# Patient Record
Sex: FEMALE | Race: WHITE | NOT HISPANIC OR LATINO | Employment: UNEMPLOYED | URBAN - METROPOLITAN AREA
[De-identification: names, ages, dates, MRNs, and addresses within clinical notes are randomized per-mention and may not be internally consistent; named-entity substitution may affect disease eponyms.]

---
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
## 2025-03-16 ENCOUNTER — HEALTH MAINTENANCE LETTER (OUTPATIENT)
Age: 20
End: 2025-03-16

## 2025-04-10 ENCOUNTER — ANCILLARY PROCEDURE (OUTPATIENT)
Dept: GENERAL RADIOLOGY | Facility: CLINIC | Age: 20
End: 2025-04-10
Attending: FAMILY MEDICINE
Payer: COMMERCIAL

## 2025-04-10 ENCOUNTER — OFFICE VISIT (OUTPATIENT)
Dept: ORTHOPEDICS | Facility: CLINIC | Age: 20
End: 2025-04-10
Payer: COMMERCIAL

## 2025-04-10 VITALS — HEIGHT: 63 IN | BODY MASS INDEX: 21.26 KG/M2 | WEIGHT: 120 LBS

## 2025-04-10 DIAGNOSIS — M25.552 PAIN OF LEFT HIP: ICD-10-CM

## 2025-04-10 DIAGNOSIS — M25.562 ACUTE PAIN OF LEFT KNEE: Primary | ICD-10-CM

## 2025-04-10 DIAGNOSIS — S76.012A STRAIN OF LEFT HIP, INITIAL ENCOUNTER: ICD-10-CM

## 2025-04-10 DIAGNOSIS — M22.2X2 PATELLOFEMORAL SYNDROME OF LEFT KNEE: ICD-10-CM

## 2025-04-10 DIAGNOSIS — M25.562 ACUTE PAIN OF LEFT KNEE: ICD-10-CM

## 2025-04-10 DIAGNOSIS — S76.012A MUSCLE STRAIN OF LEFT GLUTEAL REGION, INITIAL ENCOUNTER: ICD-10-CM

## 2025-04-10 NOTE — PATIENT INSTRUCTIONS
1. Acute pain of left knee    2. Pain of left hip    3. Patellofemoral syndrome of left knee    4. Muscle strain of left gluteal region, initial encounter    5. Strain of left hip, initial encounter      -Patient has acute left knee pain due to patellofemoral syndrome hip pain due to hip flexor and gluteal strain  -X-rays taken in office today of the left hip and knee no acute fracture dislocation or significant degenerative osseous abnormalities.  Patient has a small osteophyte lateral aspect of the patella  -Patient has had progressively worsening pain while running but has now progressed to walking  -Patient will start formal physical therapy and home exercise program to strengthen and stabilize the muscles of her leg.  -Patient will stop running.  She may crosstrain with biking and ellipitcal and swimming.  Patient will stop leg workouts as well.  She may continue upper body work outs as her pain allows  -Patient will stop running and leg training with [employer] and be excused from physical test until 5/8/25  -Concern for potential developing mild stress fracture in the hip.  If no improvement in weightbearing activity in 4 weeks, t/c MRI for further evaluation.  -Patient will be re-evaluated on 5/8/25  -Call direct clinic number [[phone]] at any time with questions or concerns.    Albert Yeo MD [facility]  [facility] [place]

## 2025-04-10 NOTE — LETTER
4/10/2025      Katie Walters  [address] [place] [address]      Dear Colleague,    Thank you for referring your patient, Katie Walters, to the [facility] [place]. Please see a copy of my visit note below.    ASSESSMENT & PLAN  Patient Instructions     1. Acute pain of left knee    2. Pain of left hip    3. Patellofemoral syndrome of left knee    4. Muscle strain of left gluteal region, initial encounter    5. Strain of left hip, initial encounter      -Patient has acute left knee pain due to patellofemoral syndrome hip pain due to hip flexor and gluteal strain  -X-rays taken in office today of the left hip and knee no acute fracture dislocation or significant degenerative osseous abnormalities.  Patient has a small osteophyte lateral aspect of the patella  -Patient has had progressively worsening pain while running but has now progressed to walking  -Patient will start formal physical therapy and home exercise program to strengthen and stabilize the muscles of her leg.  -Patient will stop running.  She may crosstrain with biking and ellipitcal and swimming.  Patient will stop leg workouts as well.  She may continue upper body work outs as her pain allows  -Patient will stop running and leg training with [employer] and be excused from physical test until 5/8/25  -Concern for potential developing mild stress fracture in the hip.  If no improvement in weightbearing activity in 4 weeks, t/c MRI for further evaluation.  -Patient will be re-evaluated on 5/8/25  -Call direct clinic number [[phone]] at any time with questions or concerns.    Albert Yeo MD [facility]  [facility]        -----    SUBJECTIVE  Katie Walters is a/an 19 year old female who is seen as a self referral for evaluation of left knee pain and left hip pain. The patient is seen by themselves.    Onset: 2 month(s) ago. Reports insidious onset without acute  "precipitating event.  Location of Pain: left lateral and posterior knee as well as anterior hip   Rating of Pain at worst: 7/10  Rating of Pain Currently: 4/10  Worsened by: Running   Better with: Ice and ibuprofen   Treatments tried: rest/activity avoidance, ice, and Ibuprofen  Associated symptoms: \"popping\" from the hip and locking in the knee   Orthopedic history: NO  Relevant surgical history: NO  Social history: Goes to [employer]     No past medical history on file.  Social History     Socioeconomic History     Marital status: Single   Tobacco Use     Smoking status: Never     Smokeless tobacco: Never         Patient's past medical, surgical, social, and family histories were reviewed today and no changes are noted.    REVIEW OF SYSTEMS:  10 point ROS is negative other than symptoms noted above in HPI, Past Medical History or as stated below  Constitutional: NEGATIVE for fever, chills, change in weight  Skin: NEGATIVE for worrisome rashes, moles or lesions  GI/: NEGATIVE for bowel or bladder changes  Neuro: NEGATIVE for weakness, dizziness or paresthesias    OBJECTIVE:  Ht 1.6 m (5' 3\")   Wt 54.4 kg (120 lb)   BMI 21.26 kg/m     General: healthy, alert and in no distress  HEENT: no scleral icterus or conjunctival erythema  Skin: no suspicious lesions or rash. No jaundice.  CV: no pedal edema  Resp: normal respiratory effort without conversational dyspnea   Psych: normal mood and affect  Gait: normal steady gait with appropriate coordination and balance  Neuro: Normal light sensory exam of lower extremity  MSK:  LEFT KNEE  Inspection:    normal alignment  Palpation:    Tender about the medial patellar facet and medial joint line. Remainder of bony and ligamentous landmarks are nontender.    No effusion is present    Patellofemoral crepitus is Absent  Range of Motion:     00 extension to 1250 flexion  Strength:    Quadriceps grossly intact    Extensor mechanism intact  Special Tests:    Positive: none    " Negative: Patellar grind, patellar apprehension, MCL/valgus stress (0 & 30 deg), LCL/varus stress (0 & 30 deg), Lachman's, anterior drawer, posterior drawer, Laxmi's    LEFT HIP  Inspection:    No swelling, bruising, discoloration, or obvious deformity or asymmetry  Palpation:    Tender about the anterior groin/joint line, gluteus medius insertion, and gluteal muscles. Otherwise all other landmarks are nontender.    Crepitus is Absent  Active Range of Motion:     Flexion limited slightly by pain, extension within normal limits / IR within normal limits / ER  within normal limits  Strength:    Flexion grossly intact / extension grossly intact / adduction grossly intact / abduction grossly intact  Special Tests:    Positive: anterior impingement (FADIR), posterior impingement (EX/AB/ER)    Negative: Logroll, resisted gluteus medius provocation, SELVIN      Independent visualization of the below image:  Recent Results (from the past 24 hours)   XR Knee Standing AP [place] Bilat Lat Left    Narrative    No acute fracture or dislocation.  Small osteophyte on the lateral aspect   of the patella.             Albert Yeo MD [facility]      Again, thank you for allowing me to participate in the care of your patient.        Sincerely,        Albert Yeo, MD    Electronically signed

## 2025-04-10 NOTE — LETTER
April 10, 2025      Katie Walters  [address] [place]  [place] [address]        To Whom It May Concern:    Katie Walters  was seen on 4/10/25.  Please excuse her from physical training test, running work outs and leg training until 5/8/25 due to injury.        Sincerely,        Albert Yeo, MD    Electronically signed

## 2025-04-10 NOTE — PROGRESS NOTES
ASSESSMENT & PLAN  Patient Instructions     1. Acute pain of left knee    2. Pain of left hip    3. Patellofemoral syndrome of left knee    4. Muscle strain of left gluteal region, initial encounter    5. Strain of left hip, initial encounter      -Patient has acute left knee pain due to patellofemoral syndrome hip pain due to hip flexor and gluteal strain  -X-rays taken in office today of the left hip and knee no acute fracture dislocation or significant degenerative osseous abnormalities.  Patient has a small osteophyte lateral aspect of the patella  -Patient has had progressively worsening pain while running but has now progressed to walking  -Patient will start formal physical therapy and home exercise program to strengthen and stabilize the muscles of her leg.  -Patient will stop running.  She may crosstrain with biking and ellipitcal and swimming.  Patient will stop leg workouts as well.  She may continue upper body work outs as her pain allows  -Patient will stop running and leg training with [employer] and be excused from physical test until 5/8/25  -Concern for potential developing mild stress fracture in the hip.  If no improvement in weightbearing activity in 4 weeks, t/c MRI for further evaluation.  -Patient will be re-evaluated on 5/8/25  -Call direct clinic number [[phone]] at any time with questions or concerns.    Albert Yeo MD [facility]  [facility] [place]        -----    SUBJECTIVE  Katie Walters is a/an 19 year old female who is seen as a self referral for evaluation of left knee pain and left hip pain. The patient is seen by themselves.    Onset: 2 month(s) ago. Reports insidious onset without acute precipitating event.  Location of Pain: left lateral and posterior knee as well as anterior hip   Rating of Pain at worst: 7/10  Rating of Pain Currently: 4/10  Worsened by: Running   Better with: Ice and ibuprofen   Treatments tried: rest/activity  "avoidance, ice, and Ibuprofen  Associated symptoms: \"popping\" from the hip and locking in the knee   Orthopedic history: NO  Relevant surgical history: NO  Social history: Goes to [employer]     No past medical history on file.  Social History     Socioeconomic History    Marital status: Single   Tobacco Use    Smoking status: Never    Smokeless tobacco: Never         Patient's past medical, surgical, social, and family histories were reviewed today and no changes are noted.    REVIEW OF SYSTEMS:  10 point ROS is negative other than symptoms noted above in HPI, Past Medical History or as stated below  Constitutional: NEGATIVE for fever, chills, change in weight  Skin: NEGATIVE for worrisome rashes, moles or lesions  GI/: NEGATIVE for bowel or bladder changes  Neuro: NEGATIVE for weakness, dizziness or paresthesias    OBJECTIVE:  Ht 1.6 m (5' 3\")   Wt 54.4 kg (120 lb)   BMI 21.26 kg/m     General: healthy, alert and in no distress  HEENT: no scleral icterus or conjunctival erythema  Skin: no suspicious lesions or rash. No jaundice.  CV: no pedal edema  Resp: normal respiratory effort without conversational dyspnea   Psych: normal mood and affect  Gait: normal steady gait with appropriate coordination and balance  Neuro: Normal light sensory exam of lower extremity  MSK:  LEFT KNEE  Inspection:    normal alignment  Palpation:    Tender about the medial patellar facet and medial joint line. Remainder of bony and ligamentous landmarks are nontender.    No effusion is present    Patellofemoral crepitus is Absent  Range of Motion:     00 extension to 1250 flexion  Strength:    Quadriceps grossly intact    Extensor mechanism intact  Special Tests:    Positive: none    Negative: Patellar grind, patellar apprehension, MCL/valgus stress (0 & 30 deg), LCL/varus stress (0 & 30 deg), Lachman's, anterior drawer, posterior drawer, Laxmi's    LEFT HIP  Inspection:    No swelling, bruising, discoloration, or obvious deformity " or asymmetry  Palpation:    Tender about the anterior groin/joint line, gluteus medius insertion, and gluteal muscles. Otherwise all other landmarks are nontender.    Crepitus is Absent  Active Range of Motion:     Flexion limited slightly by pain, extension within normal limits / IR within normal limits / ER  within normal limits  Strength:    Flexion grossly intact / extension grossly intact / adduction grossly intact / abduction grossly intact  Special Tests:    Positive: anterior impingement (FADIR), posterior impingement (EX/AB/ER)    Negative: Logroll, resisted gluteus medius provocation, SELVIN      Independent visualization of the below image:  Recent Results (from the past 24 hours)   XR Knee Standing AP [place] Bilat Lat Left    Narrative    No acute fracture or dislocation.  Small osteophyte on the lateral aspect   of the patella.             Albert Yeo MD [facility]

## 2025-04-12 ASSESSMENT — ACTIVITIES OF DAILY LIVING (ADL)
WALKING_UP_STEEP_HILLS: MODERATE DIFFICULTY
WALKING_DOWN_STEEP_HILLS: SLIGHT DIFFICULTY
LIMPING: THE SYMPTOM AFFECTS MY ACTIVITY SLIGHTLY
PAIN: THE SYMPTOM AFFECTS MY ACTIVITY SEVERELY
WEAKNESS: I DO NOT HAVE THE SYMPTOM
SPORTS_TOTAL_ITEM_SCORE: 0
STEPPING_UP_AND_DOWN_CURBS: NO DIFFICULTY AT ALL
GETTING_INTO_AND_OUT_OF_AN_AVERAGE_CAR: NO DIFFICULTY AT ALL
STANDING FOR 15 MINUTES: SLIGHT DIFFICULTY
RECREATIONAL_ACTIVITIES: MODERATE DIFFICULTY
KNEEL ON THE FRONT OF YOUR KNEE: ACTIVITY IS MINIMALLY DIFFICULT
RAW_SCORE: 58
PUTTING_ON_SOCKS_AND_SHOES: NO DIFFICULTY AT ALL
GO UP STAIRS: ACTIVITY IS NOT DIFFICULT
STIFFNESS: THE SYMPTOM AFFECTS MY ACTIVITY SLIGHTLY
DEEP SQUATTING: MODERATE DIFFICULTY
WALKING_15_MINUTES_OR_GREATER: SLIGHT DIFFICULTY
HOW_WOULD_YOU_RATE_THE_OVERALL_FUNCTION_OF_YOUR_KNEE_DURING_YOUR_USUAL_DAILY_ACTIVITIES?: NEARLY NORMAL
RUNNING_ONE_MILE: EXTREME DIFFICULTY
HOW_WOULD_YOU_RATE_YOUR_CURRENT_LEVEL_OF_FUNCTION?: ABNORMAL
STEPPING UP AND DOWN CURBS: NO DIFFICULTY AT ALL
ADL_COUNT: 17
JUMPING: MODERATE DIFFICULTY
STARTING_AND_STOPPING_QUICKLY: NO DIFFICULTY AT ALL
SPORTS_COUNT: 9
SITTING_FOR_15_MINUTES: NO DIFFICULTY AT ALL
STAND: ACTIVITY IS NOT DIFFICULT
DEEP_SQUATTING: MODERATE DIFFICULTY
GETTING_INTO_AND_OUT_OF_A_BATHTUB: NO DIFFICULTY AT ALL
SIT WITH YOUR KNEE BENT: ACTIVITY IS NOT DIFFICULT
SPORTS_HIGHEST_POTENTIAL_SCORE: 36
SITTING FOR 15 MINUTES: NO DIFFICULTY AT ALL
ROLLING OVER IN BED: SLIGHT DIFFICULTY
STAND: ACTIVITY IS NOT DIFFICULT
RISE FROM A CHAIR: ACTIVITY IS NOT DIFFICULT
GOING DOWN 1 FLIGHT OF STAIRS: NO DIFFICULTY AT ALL
LIMPING: THE SYMPTOM AFFECTS MY ACTIVITY SLIGHTLY
WALKING_FOR_APPROXIMATELY_10_MINUTES: SLIGHT DIFFICULTY
HEAVY_WORK: SLIGHT DIFFICULTY
WALK: ACTIVITY IS NOT DIFFICULT
WALKING_DOWN_STEEP_HILLS: SLIGHT DIFFICULTY
GO UP STAIRS: ACTIVITY IS NOT DIFFICULT
PAIN: THE SYMPTOM AFFECTS MY ACTIVITY SEVERELY
HOW_WOULD_YOU_RATE_THE_OVERALL_FUNCTION_OF_YOUR_KNEE_DURING_YOUR_USUAL_DAILY_ACTIVITIES?: NEARLY NORMAL
RISE FROM A CHAIR: ACTIVITY IS NOT DIFFICULT
KNEE_ACTIVITY_OF_DAILY_LIVING_SUM: 58
SWELLING: THE SYMPTOM AFFECTS MY ACTIVITY SLIGHTLY
GOING UP 1 FLIGHT OF STAIRS: NO DIFFICULTY AT ALL
HEAVY_WORK: SLIGHT DIFFICULTY
SWELLING: THE SYMPTOM AFFECTS MY ACTIVITY SLIGHTLY
LOW_IMPACT_ACTIVITIES_LIKE_FAST_WALKING: SLIGHT DIFFICULTY
GIVING WAY, BUCKLING OR SHIFTING OF KNEE: I DO NOT HAVE THE SYMPTOM
HOS_ADL_HIGHEST_POTENTIAL_SCORE: 68
GO DOWN STAIRS: ACTIVITY IS NOT DIFFICULT
TWISTING/PIVOTING_ON_INVOLVED_LEG: SLIGHT DIFFICULTY
ADL_SCORE(%): 0
WALK: ACTIVITY IS NOT DIFFICULT
HOS_ADL_SCORE(%): 79.41
SQUAT: ACTIVITY IS MINIMALLY DIFFICULT
GIVING WAY, BUCKLING OR SHIFTING OF KNEE: I DO NOT HAVE THE SYMPTOM
HOW_WOULD_YOU_RATE_YOUR_CURRENT_LEVEL_OF_FUNCTION_DURING_YOUR_SPORTS_RELATED_ACTIVITIES_FROM_0_TO_100_WITH_100_BEING_YOUR_LEVEL_OF_FUNCTION_PRIOR_TO_YOUR_HIP_PROBLEM_AND_0_BEING_THE_INABILITY_TO_PERFORM_ANY_OF_YOUR_USUAL_DAILY_ACTIVITIES?: 30
WALKING_15_MINUTES_OR_GREATER: SLIGHT DIFFICULTY
HOS_ADL_ITEM_SCORE_TOTAL: 54
ABILITY_TO_PERFORM_ACTIVITY_WITH_YOUR_NORMAL_TECHNIQUE: SLIGHT DIFFICULTY
HOW_WOULD_YOU_RATE_THE_CURRENT_FUNCTION_OF_YOUR_KNEE_DURING_YOUR_USUAL_DAILY_ACTIVITIES_ON_A_SCALE_FROM_0_TO_100_WITH_100_BEING_YOUR_LEVEL_OF_KNEE_FUNCTION_PRIOR_TO_YOUR_INJURY_AND_0_BEING_THE_INABILITY_TO_PERFORM_ANY_OF_YOUR_USUAL_DAILY_ACTIVITIES?: 60
HOW_WOULD_YOU_RATE_THE_CURRENT_FUNCTION_OF_YOUR_KNEE_DURING_YOUR_USUAL_DAILY_ACTIVITIES_ON_A_SCALE_FROM_0_TO_100_WITH_100_BEING_YOUR_LEVEL_OF_KNEE_FUNCTION_PRIOR_TO_YOUR_INJURY_AND_0_BEING_THE_INABILITY_TO_PERFORM_ANY_OF_YOUR_USUAL_DAILY_ACTIVITIES?: 60
SQUAT: ACTIVITY IS MINIMALLY DIFFICULT
AS_A_RESULT_OF_YOUR_KNEE_INJURY,_HOW_WOULD_YOU_RATE_YOUR_CURRENT_LEVEL_OF_DAILY_ACTIVITY?: NEARLY NORMAL
STIFFNESS: THE SYMPTOM AFFECTS MY ACTIVITY SLIGHTLY
SPORTS_SCORE(%): 0
GO DOWN STAIRS: ACTIVITY IS NOT DIFFICULT
ABILITY_TO_PARTICIPATE_IN_YOUR_DESIRED_SPORT_AS_LONG_AS_YOU_WOULD_LIKE: EXTREME DIFFICULTY
WALKING_APPROXIMATELY_10_MINUTES: SLIGHT DIFFICULTY
ROLLING_OVER_IN_BED: SLIGHT DIFFICULTY
WALKING_INITIALLY: NO DIFFICULTY AT ALL
GOING_UP_1_FLIGHT_OF_STAIRS: NO DIFFICULTY AT ALL
RECREATIONAL ACTIVITIES: MODERATE DIFFICULTY
GOING_DOWN_1_FLIGHT_OF_STAIRS: NO DIFFICULTY AT ALL
WALKING_INITIALLY: NO DIFFICULTY AT ALL
GETTING INTO AND OUT OF AN AVERAGE CAR: NO DIFFICULTY AT ALL
KNEEL ON THE FRONT OF YOUR KNEE: ACTIVITY IS MINIMALLY DIFFICULT
PLEASE_INDICATE_YOR_PRIMARY_REASON_FOR_REFERRAL_TO_THERAPY:: HIP
WALKING_UP_STEEP_HILLS: MODERATE DIFFICULTY
ADL_HIGHEST_POTENTIAL_SCORE: 68
LIGHT_TO_MODERATE_WORK: SLIGHT DIFFICULTY
WEAKNESS: I DO NOT HAVE THE SYMPTOM
SIT WITH YOUR KNEE BENT: ACTIVITY IS NOT DIFFICULT
STANDING_FOR_15_MINUTES: SLIGHT DIFFICULTY
AS_A_RESULT_OF_YOUR_KNEE_INJURY,_HOW_WOULD_YOU_RATE_YOUR_CURRENT_LEVEL_OF_DAILY_ACTIVITY?: NEARLY NORMAL
KNEE_ACTIVITY_OF_DAILY_LIVING_SCORE: 82.86
LIGHT_TO_MODERATE_WORK: SLIGHT DIFFICULTY
PLEASE_INDICATE_YOR_PRIMARY_REASON_FOR_REFERRAL_TO_THERAPY:: KNEE
GETTING_INTO_AND_OUT_OF_A_BATHTUB: NO DIFFICULTY AT ALL
TWISTING/PIVOTING ON INVOLVED LEG: SLIGHT DIFFICULTY
ADL_TOTAL_ITEM_SCORE: 0
PUTTING ON SOCKS AND SHOES: NO DIFFICULTY AT ALL

## 2025-04-14 ENCOUNTER — THERAPY VISIT (OUTPATIENT)
Dept: PHYSICAL THERAPY | Facility: CLINIC | Age: 20
End: 2025-04-14
Attending: FAMILY MEDICINE
Payer: COMMERCIAL

## 2025-04-14 ENCOUNTER — MYC MEDICAL ADVICE (OUTPATIENT)
Dept: ORTHOPEDICS | Facility: CLINIC | Age: 20
End: 2025-04-14

## 2025-04-14 DIAGNOSIS — S76.012A STRAIN OF LEFT HIP, INITIAL ENCOUNTER: ICD-10-CM

## 2025-04-14 DIAGNOSIS — M22.2X2 PATELLOFEMORAL SYNDROME OF LEFT KNEE: ICD-10-CM

## 2025-04-14 DIAGNOSIS — S76.012A MUSCLE STRAIN OF LEFT GLUTEAL REGION, INITIAL ENCOUNTER: ICD-10-CM

## 2025-04-14 PROCEDURE — 97110 THERAPEUTIC EXERCISES: CPT | Mod: GP

## 2025-04-14 PROCEDURE — 97161 PT EVAL LOW COMPLEX 20 MIN: CPT | Mod: GP

## 2025-04-14 NOTE — PROGRESS NOTES
PHYSICAL THERAPY EVALUATION  Type of Visit: Evaluation       Fall Risk Screen:  Fall screen completed by: PT  Have you fallen 2 or more times in the past year?: No  Have you fallen and had an injury in the past year?: No  Is patient a fall risk?: No    Subjective         Presenting condition or subjective complaint: Knee and hip pain  Date of onset: 02/01/25    Relevant medical history:     Dates & types of surgery:      Prior diagnostic imaging/testing results: X-ray     Prior therapy history for the same diagnosis, illness or injury: No      Prior Level of Function  Transfers:   Ambulation:   ADL:   IADL:     Living Environment  Social support: Alone   Type of home: Apartment/condo   Stairs to enter the home: Yes 10 Is there a railing: Yes     Ramp: Yes   Stairs inside the home: No       Help at home: None  Equipment owned:       Employment: No    Hobbies/Interests: Running, lifting, walking, sports    Patient goals for therapy: Run and lift    Physical Therapist Assessment    KEY PT FINDINGS:  1) Positive Single Leg Heel Drop Test on LLE  2) Positive FADIR on L Hip  3) Glut Med weakness on LLE    Signs and symptoms are consistent with possible stress reaction to L femoral neck complicated by L PFPS.      Subjective History    Patient was referred by Albert Yeo for Patellofemoral syndrome of left knee [M22.2X2], Muscle strain of left gluteal region, initial encounter [S76.012A], Strain of left hip, initial encounter [S76.012A]   Referring provider plan: ASSESSMENT & PLAN  Patient Instructions      1. Acute pain of left knee    2. Pain of left hip    3. Patellofemoral syndrome of left knee    4. Muscle strain of left gluteal region, initial encounter    5. Strain of left hip, initial encounter       -Patient has acute left knee pain due to patellofemoral syndrome hip pain due to hip flexor and gluteal strain  -X-rays taken in office today of the left hip and knee no acute fracture dislocation or significant  degenerative osseous abnormalities.  Patient has a small osteophyte lateral aspect of the patella  -Patient has had progressively worsening pain while running but has now progressed to walking  -Patient will start formal physical therapy and home exercise program to strengthen and stabilize the muscles of her leg.  -Patient will stop running.  She may crosstrain with biking and ellipitcal and swimming.  Patient will stop leg workouts as well.  She may continue upper body work outs as her pain allows  -Patient will stop running and leg training with [employer] and be excused from physical test until 5/8/25  -Concern for potential developing mild stress fracture in the hip.  If no improvement in weightbearing activity in 4 weeks, t/c MRI for further evaluation.  -Patient will be re-evaluated on 5/8/25  -Call direct clinic number [[phone]] at any time with questions or concerns.    PT Subjective:   Patient is a 19 year old female presenting to outpatient physical therapy with left knee and hip pain. She started noticing left knee discomfort in late February with running, was running 5x/week 2-4 miles. It would be noticeable after about 1/2 mile, then would have to go to a walk. It seemed to get better after time as she was able to increase her distance to 2 miles before it started hurting. More recently her left hip started to bother her a few weeks ago. She first started noticing this with running as well. She has stopped running for about a week due to the hip pain which is now more prominent than the knee pain. Knee does not bother her with any day to day activities. She notices her hip discomfort with walking, and can also feel it after a while with swimming or biking (20+ minutes). Sometimes if she is sitting too long in class or if she rolls over on it in bed it can be a sharper pain. Usually stopping the aggravating activity helps. Pain is a sharp shooting pain going up from knee to the hip (mainly anterior  groin).  Pain Level at Rest: 0/10  Pain Level with Use: 7/10  Pain Location: hip and knee  Pain Quality: Sharp and Shooting  Pain Frequency: intermittent  Pain is Worst: depending on activity  Pain is Exacerbated By: running, walking, sitting, exercising too long  Pain is Relieved By: cold, otc medications, and rest  Pain Progression: Unchanged     PMH: There is no problem list on file for this patient.    Medications:   Current Outpatient Medications   Medication Sig Dispense Refill    albuterol (PROAIR HFA/PROVENTIL HFA/VENTOLIN HFA) 108 (90 Base) MCG/ACT inhaler Inhale 1-2 puffs into the lungs every 6 hours as needed for shortness of breath, wheezing or cough. 18 g 0    benzonatate (TESSALON) 100 MG capsule Take 1 capsule (100 mg) by mouth 3 times daily as needed for cough. (Patient not taking: Reported on 10/2/2024) 30 capsule 0     No current facility-administered medications for this visit.           Imaging: EXAM: XR PELVIS AND HIP LEFT 2 VIEWS  LOCATION: [facility] [place]  DATE: 4/10/2025     INDICATION:  Pain of left hip  COMPARISON: None.                                                                      IMPRESSION: Left hip joint space is maintained. There is no evidence of an acute fracture or dislocation.    X-Ray L Knee 4/10/25  No acute fracture or dislocation.  Small osteophyte on the lateral aspect   of the patella.       Red Flag Screening: Negative  Prior Treatment Includes: None  Lifestyle History:  Occupation: Student  Experience with physical activity: Running, Lifting - Strength Training  General Health Assessment: NT.  Referral recommended? No  Additional Considerations: [employer]    Patient Goals for Physical Therapy: Return to running without pain.       Objective   Objective Examination    Static Posture/Observation  No obvious findings    Gait: Decreased hip extension    Dynamic Movement Screen  Single leg stance :   RIGHT: Diffculty with balance eyes  closed mild  LEFT:  mild discomfort in left hip  Double limb squat:   Good technique/no significant findings  Single limb squat:   RIGHT: Good technique/no significant findings  LEFT: Anterior knee translation, Hip internal rotation, and left hip pain    Lumbar Spine Screen:   ROM: NT  Repeated Movements: NT    SIJ Screen:   Compression/Approximation: negative  Gapping/Distraction: negative  Sacral Thrust: negative  Thigh Thrust: negative  OTHER:    HIP Range of Motion: (* indicates patient's primary complaint)   AROM R PROM R AROM L PROM L MMT R MMT L   Flexion  WNL  WNL 5/5 5/5   Extension     5/5 5/5   ER  WNL  WNL     IR  WNL  WNL     Prone IR     X X   Abduction     4/5 4-/5   Adduction         - Supine X X X X     - Hooklying X X X X     - 90/90 X X X X       KNEE AROM R PROM R AROM L PROM L MMT R MMT L   Extension  WNL  WNL 5/5 5/5   Flexion  WNL  WNL 5/5 5/5       Special tests: blank if not tested; * indicates patient's primary complaint   Right Left    Muscle Testing   Jeremiah's - -   Rivera English's - -   Hamstring 90/90 - -   Piriformis      Intra-Articular Testing   FADIR - +   Scour - -   Ligament. Teres     Log Roll      Neural Tension   SLR - -   Slump     Femoral Nerve      Other   SELVIN     Fulcrum - -   SL Heel Drop Test: + on L    Palpation  Tender to palpation at:   -RIGHT: none  -LEFT:  none        Assessment & Plan   CLINICAL IMPRESSIONS  Medical Diagnosis: Patellofemoral syndrome of left knee (M22.2X2), Muscle strain of left gluteal region, initial encounter (S76.012A), Strain of left hip, initial encounter (S76.012A)    Treatment Diagnosis: L femoral neck stress reaction possible; L knee PFPS   Impression/Assessment: Patient is a 19 year old female with left hip and knee complaints.  The following significant findings have been identified: Pain, Decreased joint mobility, Decreased strength, Impaired gait, Impaired muscle performance, and Decreased activity tolerance. These impairments  interfere with their ability to perform self care tasks, work tasks, recreational activities, household chores, driving , household mobility, and community mobility as compared to previous level of function.     Clinical Decision Making (Complexity):  Clinical Presentation: Stable/Uncomplicated  Clinical Presentation Rationale: based on medical and personal factors listed in PT evaluation  Clinical Decision Making (Complexity): Low complexity    PLAN OF CARE  Treatment Interventions:  Interventions: Gait Training, Manual Therapy, Neuromuscular Re-education, Therapeutic Activity, Therapeutic Exercise, Self-Care/Home Management, Aquatic Therapy    Long Term Goals     PT Goal 1  Goal Identifier: LTG1  Goal Description: Patient will be able to run for 2 miles with 0/10 left hip or knee pain  Rationale: to maximize safety and independence with performance of ADLs and functional tasks  Target Date: 05/26/25  PT Goal 2  Goal Identifier: LTG2  Goal Description: Patient will report an improvement of at least 7 on the KOS to demonstrate MCID  Rationale: to maximize safety and independence with performance of ADLs and functional tasks;to maximize safety and independence within the home;to maximize safety and independence within the community;to maximize safety and independence with transportation;to maximize safety and independence with self cares      Frequency of Treatment: 1x/wk  Duration of Treatment: 6 weeks    Recommended Referrals to Other Professionals:   Education Assessment:   Learner/Method: Patient    Risks and benefits of evaluation/treatment have been explained.   Patient/Family/caregiver agrees with Plan of Care.     Evaluation Time:     PT Eval, Low Complexity Minutes (29150): 25       Signing Clinician: Catarino Kim PT

## 2025-04-14 NOTE — LETTER
April 17, 2025      Katie Walters  [address] [place] [address]        To Whom It May Concern:    Katie Walters was seen in my office on 4/10/25 and was found to have left knee patellofemoral syndrome as well as a left gluteal muscle strain. Please excuse her from the following activities until she is seen for follow up on 5/8/25:  - No running;  Instead she may crosstrain with biking and ellipitcal and swimming.    - Patient will stop leg workouts as well.  She may continue upper body work outs as her pain allows  - No physical testing   - No ruck marching   - Should avoid kneeling or extended walking to prevent flare ups.    Contact my office with additional questions    Sincerely,      Albert Yeo, MD    Electronically signed

## 2025-04-14 NOTE — TELEPHONE ENCOUNTER
See patient message and update patient work ability letter for the  with requested information.     Angela Briggs, ATC

## 2025-04-17 NOTE — TELEPHONE ENCOUNTER
[facility] restrictions letter created with provider supervision and patient confirmation.    Christian Slaughter, ATC

## 2025-04-23 ENCOUNTER — THERAPY VISIT (OUTPATIENT)
Dept: PHYSICAL THERAPY | Facility: CLINIC | Age: 20
End: 2025-04-23
Payer: COMMERCIAL

## 2025-04-23 DIAGNOSIS — M22.2X2 PATELLOFEMORAL SYNDROME OF LEFT KNEE: Primary | ICD-10-CM

## 2025-04-23 DIAGNOSIS — S76.012A STRAIN OF LEFT HIP, INITIAL ENCOUNTER: ICD-10-CM

## 2025-04-23 DIAGNOSIS — S76.012A MUSCLE STRAIN OF LEFT GLUTEAL REGION, INITIAL ENCOUNTER: ICD-10-CM

## 2025-04-23 PROCEDURE — 97110 THERAPEUTIC EXERCISES: CPT | Mod: GP

## 2025-05-06 ENCOUNTER — THERAPY VISIT (OUTPATIENT)
Dept: PHYSICAL THERAPY | Facility: CLINIC | Age: 20
End: 2025-05-06
Payer: COMMERCIAL

## 2025-05-06 DIAGNOSIS — S76.012A MUSCLE STRAIN OF LEFT GLUTEAL REGION, INITIAL ENCOUNTER: ICD-10-CM

## 2025-05-06 DIAGNOSIS — M22.2X2 PATELLOFEMORAL SYNDROME OF LEFT KNEE: Primary | ICD-10-CM

## 2025-05-06 DIAGNOSIS — S76.012A STRAIN OF LEFT HIP, INITIAL ENCOUNTER: ICD-10-CM

## 2025-05-06 PROCEDURE — 97110 THERAPEUTIC EXERCISES: CPT | Mod: GP

## 2025-05-20 ENCOUNTER — OFFICE VISIT (OUTPATIENT)
Dept: ORTHOPEDICS | Facility: CLINIC | Age: 20
End: 2025-05-20
Payer: COMMERCIAL

## 2025-05-20 DIAGNOSIS — M25.551 ACUTE RIGHT HIP PAIN: ICD-10-CM

## 2025-05-20 DIAGNOSIS — S76.012D STRAIN OF LEFT HIP, SUBSEQUENT ENCOUNTER: Primary | ICD-10-CM

## 2025-05-20 PROCEDURE — G2211 COMPLEX E/M VISIT ADD ON: HCPCS | Performed by: FAMILY MEDICINE

## 2025-05-20 PROCEDURE — 99214 OFFICE O/P EST MOD 30 MIN: CPT | Performed by: FAMILY MEDICINE

## 2025-05-20 NOTE — LETTER
5/20/2025      Katie Walters  [address] [place] [address]      Dear Colleague,    Thank you for referring your patient, Katie Walters, to the [facility] [place]. Please see a copy of my visit note below.    ASSESSMENT & PLAN  Patient Instructions     1. Strain of left hip, subsequent encounter    2. Acute right hip pain      - Following up for left hip pain due to a strain and possible stress fracture as well as development of right hip pain possibly due to developing stress fracture  -Patient has completed physical therapy and home exercises with worsening pain which is now progressed to day-to-day walking  -Patient will get an MRI of bilateral hips for further evaluation of intra-articular injuries  - Your MRI has been ordered. You may call [phone] to schedule over the phone.  Please send us a [employer] message after your MRI is complete to discuss results and next of treatment options  -Call direct clinic number [[phone]] at any time with questions or concerns.    Albert Yeo MD [facility]  [facility]        -----    SUBJECTIVE:  Katie Walters is a 20 year old female who is seen in follow-up for left hip.They were last seen 4/10/2025.     Since their last visit reports worsening pain. They indicate that their current pain level is 2/10. They have tried rest/activity avoidance and physical therapy (4 visits). Patient states that she's been working with physical therapy, which has helped some, but continues to have discomfort. She's only been doing non-impact leg activities such as biking and swimming. She's started to notice right hip pain about 2 weeks ago, in the groin area. She has constant pain, even at rest.     The patient is seen by themselves.    Patient's past medical, surgical, social, and family histories were reviewed today and no changes are noted.    REVIEW OF SYSTEMS:  Constitutional:  NEGATIVE for fever, chills, change in weight  Skin: NEGATIVE for worrisome rashes, moles or lesions  GI/: NEGATIVE for bowel or bladder changes  Neuro: NEGATIVE for weakness, dizziness or paresthesias    OBJECTIVE:  There were no vitals taken for this visit.   General: healthy, alert and in no distress  HEENT: no scleral icterus or conjunctival erythema  Skin: no suspicious lesions or rash. No jaundice.  CV: regular rhythm by palpation, no pedal edema  Resp: normal respiratory effort without conversational dyspnea   Psych: normal mood and affect  Gait: normal steady gait with appropriate coordination and balance  Neuro: normal light touch sensory exam of the extremities.    MSK:  BILATERAL HIP  Inspection:    No swelling, bruising, discoloration, or obvious deformity or asymmetry  Palpation:    Tender about the anterior groin/joint line, gluteus medius insertion, and gluteal muscles. Otherwise all other landmarks are nontender.    Crepitus is Absent  Active Range of Motion:     Flexion limited slightly by pain, extension within normal limits / IR within normal limits / ER  within normal limits  Strength:    Flexion grossly intact / extension grossly intact / adduction grossly intact / abduction grossly intact  Special Tests:    Positive: anterior impingement (FADIR), posterior impingement (EX/AB/ER)    Negative: Logroll, resisted gluteus medius provocation, SELVIN    Independent visualization of the below image:        Albert Yeo MD, [facility]        Again, thank you for allowing me to participate in the care of your patient.        Sincerely,        Albert Yeo, MD    Electronically signed

## 2025-05-20 NOTE — PATIENT INSTRUCTIONS
1. Strain of left hip, subsequent encounter    2. Acute right hip pain      - Following up for left hip pain due to a strain and possible stress fracture as well as development of right hip pain possibly due to developing stress fracture  -Patient has completed physical therapy and home exercises with worsening pain which is now progressed to day-to-day walking  -Patient will get an MRI of bilateral hips for further evaluation of intra-articular injuries  - Your MRI has been ordered. You may call [phone] to schedule over the phone.  Please send us a [employer] message after your MRI is complete to discuss results and next of treatment options  -Call direct clinic number [[phone]] at any time with questions or concerns.    Albert Yeo MD CAQSM  [facility]  [facility] [place]

## 2025-05-20 NOTE — PROGRESS NOTES
ASSESSMENT & PLAN  Patient Instructions     1. Strain of left hip, subsequent encounter    2. Acute right hip pain      - Following up for left hip pain due to a strain and possible stress fracture as well as development of right hip pain possibly due to developing stress fracture  -Patient has completed physical therapy and home exercises with worsening pain which is now progressed to day-to-day walking  -Patient will get an MRI of bilateral hips for further evaluation of intra-articular injuries  - Your MRI has been ordered. You may call [phone] to schedule over the phone.  Please send us a [employer] message after your MRI is complete to discuss results and next of treatment options  -Call direct clinic number [[phone]] at any time with questions or concerns.    Albert Yeo MD [facility]  [facility]        -----    SUBJECTIVE:  Katie Walters is a 20 year old female who is seen in follow-up for left hip.They were last seen 4/10/2025.     Since their last visit reports worsening pain. They indicate that their current pain level is 2/10. They have tried rest/activity avoidance and physical therapy (4 visits). Patient states that she's been working with physical therapy, which has helped some, but continues to have discomfort. She's only been doing non-impact leg activities such as biking and swimming. She's started to notice right hip pain about 2 weeks ago, in the groin area. She has constant pain, even at rest.     The patient is seen by themselves.    Patient's past medical, surgical, social, and family histories were reviewed today and no changes are noted.    REVIEW OF SYSTEMS:  Constitutional: NEGATIVE for fever, chills, change in weight  Skin: NEGATIVE for worrisome rashes, moles or lesions  GI/: NEGATIVE for bowel or bladder changes  Neuro: NEGATIVE for weakness, dizziness or paresthesias    OBJECTIVE:  There were no vitals taken for this  visit.   General: healthy, alert and in no distress  HEENT: no scleral icterus or conjunctival erythema  Skin: no suspicious lesions or rash. No jaundice.  CV: regular rhythm by palpation, no pedal edema  Resp: normal respiratory effort without conversational dyspnea   Psych: normal mood and affect  Gait: normal steady gait with appropriate coordination and balance  Neuro: normal light touch sensory exam of the extremities.    MSK:  BILATERAL HIP  Inspection:    No swelling, bruising, discoloration, or obvious deformity or asymmetry  Palpation:    Tender about the anterior groin/joint line, gluteus medius insertion, and gluteal muscles. Otherwise all other landmarks are nontender.    Crepitus is Absent  Active Range of Motion:     Flexion limited slightly by pain, extension within normal limits / IR within normal limits / ER  within normal limits  Strength:    Flexion grossly intact / extension grossly intact / adduction grossly intact / abduction grossly intact  Special Tests:    Positive: anterior impingement (FADIR), posterior impingement (EX/AB/ER)    Negative: Logroll, resisted gluteus medius provocation, SELVIN    Independent visualization of the below image:        Albert Yeo MD, [facility]

## 2025-06-04 ENCOUNTER — TELEPHONE (OUTPATIENT)
Dept: ORTHOPEDICS | Facility: CLINIC | Age: 20
End: 2025-06-04
Payer: COMMERCIAL

## 2025-06-04 NOTE — TELEPHONE ENCOUNTER
Pt needs the doctor to send prior auth for MRI so that she can schedule, please call regarding this    Could we send this information to you in [employer] or would you prefer to receive a phone call?:   Patient would prefer a phone call   Okay to leave a detailed message?: Yes at Cell number on file:    Telephone Information:   Mobile [phone]

## 2025-06-05 NOTE — TELEPHONE ENCOUNTER
I returned the patient's phone call to discuss PA for bilateral hip MRIs. Discussed that you schedule the MRIs with imaging department and then they handle getting approval prior to MRI appointment. Patient states that she lives in [place] so will be getting MRIs done there, I let her know that the [place] facility may get PA a different way, but she should contact their facility for that information. I told her we could fax the MRI orders to the [place] facility though since outside locations likely will not let her schedule MRIs without an order. She is going to find the fax number and then reach back out to us when she has that. All questions were answered at this time. Nkechi Degroot, ATC on 6/5/2025 at 1:43 PM